# Patient Record
Sex: FEMALE | Race: OTHER | NOT HISPANIC OR LATINO | URBAN - METROPOLITAN AREA
[De-identification: names, ages, dates, MRNs, and addresses within clinical notes are randomized per-mention and may not be internally consistent; named-entity substitution may affect disease eponyms.]

---

## 2017-05-12 ENCOUNTER — INPATIENT (INPATIENT)
Facility: HOSPITAL | Age: 77
LOS: 2 days | Discharge: HOME | DRG: 391 | End: 2017-05-15
Attending: INTERNAL MEDICINE | Admitting: INTERNAL MEDICINE
Payer: COMMERCIAL

## 2017-05-12 ENCOUNTER — TRANSCRIPTION ENCOUNTER (OUTPATIENT)
Age: 77
End: 2017-05-12

## 2017-05-12 VITALS
WEIGHT: 160.06 LBS | HEIGHT: 66 IN | OXYGEN SATURATION: 98 % | SYSTOLIC BLOOD PRESSURE: 146 MMHG | HEART RATE: 68 BPM | TEMPERATURE: 98 F | RESPIRATION RATE: 16 BRPM | DIASTOLIC BLOOD PRESSURE: 75 MMHG

## 2017-05-12 DIAGNOSIS — Z29.9 ENCOUNTER FOR PROPHYLACTIC MEASURES, UNSPECIFIED: ICD-10-CM

## 2017-05-12 DIAGNOSIS — R07.9 CHEST PAIN, UNSPECIFIED: ICD-10-CM

## 2017-05-12 DIAGNOSIS — I10 ESSENTIAL (PRIMARY) HYPERTENSION: ICD-10-CM

## 2017-05-12 DIAGNOSIS — E78.5 HYPERLIPIDEMIA, UNSPECIFIED: ICD-10-CM

## 2017-05-12 DIAGNOSIS — I26.99 OTHER PULMONARY EMBOLISM WITHOUT ACUTE COR PULMONALE: ICD-10-CM

## 2017-05-12 LAB
ANION GAP SERPL CALC-SCNC: 7 MMOL/L — SIGNIFICANT CHANGE UP (ref 5–17)
APTT BLD: 44.3 SEC — HIGH (ref 27.5–37.4)
BASOPHILS # BLD AUTO: 0.1 K/UL — SIGNIFICANT CHANGE UP (ref 0–0.2)
BASOPHILS NFR BLD AUTO: 1.2 % — SIGNIFICANT CHANGE UP (ref 0–2)
BUN SERPL-MCNC: 11 MG/DL — SIGNIFICANT CHANGE UP (ref 7–18)
CALCIUM SERPL-MCNC: 8.9 MG/DL — SIGNIFICANT CHANGE UP (ref 8.4–10.5)
CHLORIDE SERPL-SCNC: 108 MMOL/L — SIGNIFICANT CHANGE UP (ref 96–108)
CK MB BLD-MCNC: <1.7 % — SIGNIFICANT CHANGE UP (ref 0–3.5)
CK MB BLD-MCNC: <1.8 % — SIGNIFICANT CHANGE UP (ref 0–3.5)
CK MB CFR SERPL CALC: <1 NG/ML — SIGNIFICANT CHANGE UP (ref 0–3.6)
CK SERPL-CCNC: 56 U/L — SIGNIFICANT CHANGE UP (ref 21–215)
CK SERPL-CCNC: 58 U/L — SIGNIFICANT CHANGE UP (ref 21–215)
CO2 SERPL-SCNC: 30 MMOL/L — SIGNIFICANT CHANGE UP (ref 22–31)
CREAT SERPL-MCNC: 0.94 MG/DL — SIGNIFICANT CHANGE UP (ref 0.5–1.3)
D DIMER BLD IA.RAPID-MCNC: <150 NG/ML DDU — SIGNIFICANT CHANGE UP
EOSINOPHIL # BLD AUTO: 0.2 K/UL — SIGNIFICANT CHANGE UP (ref 0–0.5)
EOSINOPHIL NFR BLD AUTO: 3 % — SIGNIFICANT CHANGE UP (ref 0–6)
GLUCOSE SERPL-MCNC: 141 MG/DL — HIGH (ref 70–99)
HCT VFR BLD CALC: 38.9 % — SIGNIFICANT CHANGE UP (ref 34.5–45)
HGB BLD-MCNC: 13.3 G/DL — SIGNIFICANT CHANGE UP (ref 11.5–15.5)
INR BLD: 3.19 RATIO — HIGH (ref 0.88–1.16)
LYMPHOCYTES # BLD AUTO: 2.1 K/UL — SIGNIFICANT CHANGE UP (ref 1–3.3)
LYMPHOCYTES # BLD AUTO: 28.7 % — SIGNIFICANT CHANGE UP (ref 13–44)
MCHC RBC-ENTMCNC: 28.6 PG — SIGNIFICANT CHANGE UP (ref 27–34)
MCHC RBC-ENTMCNC: 34.1 GM/DL — SIGNIFICANT CHANGE UP (ref 32–36)
MCV RBC AUTO: 83.9 FL — SIGNIFICANT CHANGE UP (ref 80–100)
MONOCYTES # BLD AUTO: 0.6 K/UL — SIGNIFICANT CHANGE UP (ref 0–0.9)
MONOCYTES NFR BLD AUTO: 8.7 % — SIGNIFICANT CHANGE UP (ref 2–14)
NEUTROPHILS # BLD AUTO: 4.4 K/UL — SIGNIFICANT CHANGE UP (ref 1.8–7.4)
NEUTROPHILS NFR BLD AUTO: 58.5 % — SIGNIFICANT CHANGE UP (ref 43–77)
PLATELET # BLD AUTO: 174 K/UL — SIGNIFICANT CHANGE UP (ref 150–400)
POTASSIUM SERPL-MCNC: 3.1 MMOL/L — LOW (ref 3.5–5.3)
POTASSIUM SERPL-SCNC: 3.1 MMOL/L — LOW (ref 3.5–5.3)
PROTHROM AB SERPL-ACNC: 35.6 SEC — HIGH (ref 9.8–12.7)
RBC # BLD: 4.64 M/UL — SIGNIFICANT CHANGE UP (ref 3.8–5.2)
RBC # FLD: 12.8 % — SIGNIFICANT CHANGE UP (ref 10.3–14.5)
SODIUM SERPL-SCNC: 145 MMOL/L — SIGNIFICANT CHANGE UP (ref 135–145)
TROPONIN I SERPL-MCNC: <0.015 NG/ML — SIGNIFICANT CHANGE UP (ref 0–0.04)
WBC # BLD: 7.4 K/UL — SIGNIFICANT CHANGE UP (ref 3.8–10.5)
WBC # FLD AUTO: 7.4 K/UL — SIGNIFICANT CHANGE UP (ref 3.8–10.5)

## 2017-05-12 PROCEDURE — 71020: CPT | Mod: 26

## 2017-05-12 PROCEDURE — 99285 EMERGENCY DEPT VISIT HI MDM: CPT | Mod: 25

## 2017-05-12 RX ORDER — METOPROLOL TARTRATE 50 MG
12.5 TABLET ORAL
Qty: 0 | Refills: 0 | Status: DISCONTINUED | OUTPATIENT
Start: 2017-05-12 | End: 2017-05-13

## 2017-05-12 RX ORDER — ASPIRIN/CALCIUM CARB/MAGNESIUM 324 MG
81 TABLET ORAL DAILY
Qty: 0 | Refills: 0 | Status: DISCONTINUED | OUTPATIENT
Start: 2017-05-12 | End: 2017-05-15

## 2017-05-12 RX ORDER — AMLODIPINE BESYLATE 2.5 MG/1
10 TABLET ORAL DAILY
Qty: 0 | Refills: 0 | Status: DISCONTINUED | OUTPATIENT
Start: 2017-05-12 | End: 2017-05-15

## 2017-05-12 RX ORDER — ASPIRIN/CALCIUM CARB/MAGNESIUM 324 MG
325 TABLET ORAL ONCE
Qty: 0 | Refills: 0 | Status: COMPLETED | OUTPATIENT
Start: 2017-05-12 | End: 2017-05-12

## 2017-05-12 RX ORDER — ATORVASTATIN CALCIUM 80 MG/1
40 TABLET, FILM COATED ORAL AT BEDTIME
Qty: 0 | Refills: 0 | Status: DISCONTINUED | OUTPATIENT
Start: 2017-05-12 | End: 2017-05-15

## 2017-05-12 RX ORDER — ATORVASTATIN CALCIUM 80 MG/1
80 TABLET, FILM COATED ORAL AT BEDTIME
Qty: 0 | Refills: 0 | Status: COMPLETED | OUTPATIENT
Start: 2017-05-12 | End: 2017-05-12

## 2017-05-12 RX ORDER — WARFARIN SODIUM 2.5 MG/1
2 TABLET ORAL ONCE
Qty: 0 | Refills: 0 | Status: COMPLETED | OUTPATIENT
Start: 2017-05-12 | End: 2017-05-12

## 2017-05-12 RX ORDER — POTASSIUM CHLORIDE 20 MEQ
40 PACKET (EA) ORAL ONCE
Qty: 0 | Refills: 0 | Status: COMPLETED | OUTPATIENT
Start: 2017-05-12 | End: 2017-05-12

## 2017-05-12 RX ORDER — ACETAMINOPHEN WITH CODEINE 300MG-30MG
2 TABLET ORAL EVERY 6 HOURS
Qty: 0 | Refills: 0 | Status: DISCONTINUED | OUTPATIENT
Start: 2017-05-12 | End: 2017-05-15

## 2017-05-12 RX ORDER — PANTOPRAZOLE SODIUM 20 MG/1
40 TABLET, DELAYED RELEASE ORAL
Qty: 0 | Refills: 0 | Status: DISCONTINUED | OUTPATIENT
Start: 2017-05-12 | End: 2017-05-15

## 2017-05-12 RX ADMIN — ATORVASTATIN CALCIUM 40 MILLIGRAM(S): 80 TABLET, FILM COATED ORAL at 22:21

## 2017-05-12 RX ADMIN — Medication 81 MILLIGRAM(S): at 14:12

## 2017-05-12 RX ADMIN — PANTOPRAZOLE SODIUM 40 MILLIGRAM(S): 20 TABLET, DELAYED RELEASE ORAL at 17:44

## 2017-05-12 RX ADMIN — Medication 500 MILLIGRAM(S): at 20:19

## 2017-05-12 RX ADMIN — ATORVASTATIN CALCIUM 80 MILLIGRAM(S): 80 TABLET, FILM COATED ORAL at 08:39

## 2017-05-12 RX ADMIN — Medication 2 TABLET(S): at 19:30

## 2017-05-12 RX ADMIN — Medication 500 MILLIGRAM(S): at 19:30

## 2017-05-12 RX ADMIN — WARFARIN SODIUM 2 MILLIGRAM(S): 2.5 TABLET ORAL at 22:21

## 2017-05-12 NOTE — DISCHARGE NOTE ADULT - CARE PLAN
Principal Discharge DX:	Chest pain  Goal:	Follow up with outpatient cardiologist  Instructions for follow-up, activity and diet:	Your chest pain was not fully investigated and it is still unknown if you have any coronary artery ischemia. Please follow up with a cardiologist of your choice for a stress test as outpatient to fully investigate your cardiac function. Your echocardiogram showed normal left ventricular function. Please see your cardiologist or PCP if you continue to have severe chest pain.  Secondary Diagnosis:	HTN (hypertension)  Goal:	Control blood pressure  Instructions for follow-up, activity and diet:	Please continue to take Norvasc to control your blood pressure. You have electrolyte abnormalities secondary to your HCTZ so please do not take it for now as your blood pressure has been controlled without it. Please follow up with your PCP for regular blood pressure checks and medication adjustments.  Secondary Diagnosis:	HLD (hyperlipidemia)  Goal:	Control cholesterol and lipids  Instructions for follow-up, activity and diet:	You had your lipid panel tested which showed well controlled cholesterol but poorly controlled triglycerides. Please continue to take your lipitor and eat a low fat diet - decrease your intake of fatty fried foods and nuts to decrease your triglyceride level. Please follow up with your PCP for a repeat check in 1 month time and review your medications with him.  Secondary Diagnosis:	GERD (gastroesophageal reflux disease)  Goal:	Take proton pump inhibitors  Instructions for follow-up, activity and diet:	Your chest pain may have been secondary to gastric reflux. Please take Protonix for 6 weeks to see if that relieves your chest pain. If it does not, please follow up with a gastroenterologist of your choice to investigate with further testing.  Secondary Diagnosis:	Pulmonary embolism  Goal:	Continue with anticoagulation  Instructions for follow-up, activity and diet:	Your high INR indicated your coumadin dose is inappropriate. Please skip a dose of coumadin today and take coumadin starting 5/15/17 at night and follow up with your PCP/coumadin check in 3 days time to evaluate your INR and make adjustments to your coumadin dose. Principal Discharge DX:	Chest pain  Goal:	Follow up with outpatient cardiologist  Instructions for follow-up, activity and diet:	Your chest pain was not fully investigated and it is still unknown if you have any coronary artery ischemia. Please follow up with a cardiologist of your choice for a stress test as outpatient to fully investigate your cardiac function. Your echocardiogram showed normal left ventricular function. Please see your cardiologist or PCP if you continue to have severe chest pain.  Secondary Diagnosis:	HTN (hypertension)  Goal:	Control blood pressure  Instructions for follow-up, activity and diet:	Please continue to take Norvasc to control your blood pressure. You have electrolyte abnormalities secondary to your HCTZ so please do not take it for now as your blood pressure has been controlled without it. Please follow up with your PCP for regular blood pressure checks and medication adjustments.  Secondary Diagnosis:	HLD (hyperlipidemia)  Goal:	Control cholesterol and lipids  Instructions for follow-up, activity and diet:	You had your lipid panel tested which showed well controlled cholesterol but poorly controlled triglycerides. Please continue to take your lipitor and eat a low fat diet - decrease your intake of fatty fried foods and nuts to decrease your triglyceride level. Please follow up with your PCP for a repeat check in 1 month time and review your medications with him.  Secondary Diagnosis:	GERD (gastroesophageal reflux disease)  Goal:	Take proton pump inhibitors  Instructions for follow-up, activity and diet:	Your chest pain may have been secondary to gastric reflux. Please take Protonix for 6 weeks to see if that relieves your chest pain. If it does not, please follow up with a gastroenterologist of your choice to investigate with further testing.  Secondary Diagnosis:	Pulmonary embolism  Goal:	Continue with anticoagulation  Instructions for follow-up, activity and diet:	Your high INR indicated your coumadin dose is inappropriate. Please skip a dose of coumadin today and take coumadin starting 5/15/17 at night and follow up with your PCP/coumadin check in 3 days time to evaluate your INR and make adjustments to your coumadin dose. Please follow up with your PCP to investigate changing anticoagulation to a newer oral agent which doesn't need weekly monitoring. Principal Discharge DX:	Chest pain  Goal:	Relief of chest discomfort.  Instructions for follow-up, activity and diet:	Your chest pain was not fully investigated and it is still unknown if you have any coronary artery ischemia. Please follow up with a cardiologist of your choice for a stress test as outpatient to fully investigate your cardiac function. Your echocardiogram showed normal left ventricular function. Please see your cardiologist or PCP if you continue to have severe chest pain.  You was seen by Dr. Gill (Cardiologist) in the hospital  Secondary Diagnosis:	HTN (hypertension)  Goal:	Control blood pressure  Instructions for follow-up, activity and diet:	Please continue to take Norvasc to control your blood pressure. You have electrolyte abnormalities secondary to your HCTZ so please do not take it for now as your blood pressure has been controlled without it. Please follow up with your PCP for regular blood pressure checks and medication adjustments.  Secondary Diagnosis:	HLD (hyperlipidemia)  Goal:	Control cholesterol and lipids  Instructions for follow-up, activity and diet:	You had your lipid panel tested which showed well controlled cholesterol but poorly controlled triglycerides. Please continue to take your lipitor and eat a low fat diet - decrease your intake of fatty fried foods and nuts to decrease your triglyceride level. Please follow up with your PCP for a repeat check in 1 month time and review your medications with him.  Secondary Diagnosis:	GERD (gastroesophageal reflux disease)  Goal:	Resolution of abdominal and chest discomfort at night  Instructions for follow-up, activity and diet:	Your chest pain may have been secondary to gastric reflux. Please take Protonix for 6 weeks to see if that relieves your chest pain. If it does not, please follow up with a gastroenterologist of your choice to investigate with further testing. If persistent chest discomfort at night despite trial of Protonix, follow up with GI for EGD top rule out H. pylori infection. If positive will need 10 to 14 days treatment with 2 antibiotics and PPI (Triple therapy).  Secondary Diagnosis:	Pulmonary embolism  Goal:	Continue with anticoagulation  Instructions for follow-up, activity and diet:	Your high INR indicated your coumadin dose is inappropriate (elevated). Please skip a dose of coumadin today and take coumadin starting 5/15/17 at night at 3mg dose and follow up with your PCP/coumadin check in 3 days time to evaluate your INR and make adjustments to your coumadin dose. Please follow up with your PCP to investigate changing anticoagulation to a newer oral agent which doesn't need weekly monitoring (as you had concern about continuing on coumadin). 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  Instructions for follow-up, activity and diet:	Your chest pain was fully investigated with echocardiogram and nuclear stress test and both tests show that there's no evidence of any heart disease. Likely the source of your chest pain is from your gastric reflux. Please take protonix daily and follow up with a gastroenterologist if the pain doesn't resolve fully.  Secondary Diagnosis:	HTN (hypertension)  Goal:	Control blood pressure  Instructions for follow-up, activity and diet:	Please continue to take Norvasc to control your blood pressure. You have electrolyte abnormalities secondary to your HCTZ so please do not take it for now as your blood pressure has been controlled without it. Please follow up with your PCP for regular blood pressure checks and medication adjustments.  Secondary Diagnosis:	HLD (hyperlipidemia)  Goal:	Control cholesterol and lipids  Instructions for follow-up, activity and diet:	You had your lipid panel tested which showed well controlled cholesterol but poorly controlled triglycerides. Please continue to take your lipitor and eat a low fat diet - decrease your intake of fatty fried foods and nuts to decrease your triglyceride level. Please follow up with your PCP for a repeat check in 1 month time and review your medications with him.  Secondary Diagnosis:	GERD (gastroesophageal reflux disease)  Goal:	Resolution of abdominal and chest discomfort at night  Instructions for follow-up, activity and diet:	Your chest pain may have been secondary to gastric reflux. Please take Protonix for 6 weeks to see if that relieves your chest pain. If it does not, please follow up with a gastroenterologist of your choice to investigate with further testing. If persistent chest discomfort at night despite trial of Protonix, follow up with GI for EGD top rule out H. pylori infection. If positive will need 10 to 14 days treatment with 2 antibiotics and PPI (Triple therapy).  Secondary Diagnosis:	Pulmonary embolism  Goal:	Continue with anticoagulation  Instructions for follow-up, activity and diet:	Your high INR indicated your coumadin dose is inappropriate (elevated). Please take a 3mg dose of coumadin and follow up with your PCP/coumadin check in 3 days time to evaluate your INR and make adjustments to your coumadin dose. Please follow up with your PCP to investigate changing anticoagulation to a newer oral agent which doesn't need weekly monitoring (as you had concern about continuing on coumadin). 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  Instructions for follow-up, activity and diet:	Your chest pain was fully investigated with echocardiogram and nuclear stress test and both tests show that there's no evidence of any heart disease. Likely the source of your chest pain is from your gastric reflux. Please take protonix daily and follow up with a gastroenterologist if the pain doesn't resolve fully.  Secondary Diagnosis:	HTN (hypertension)  Goal:	Control blood pressure  Instructions for follow-up, activity and diet:	Please continue to take Norvasc to control your blood pressure. You have electrolyte abnormalities (low potassium) secondary to your HCTZ so please do not take it for now as your blood pressure has been controlled without it. Please follow up with your PCP for regular blood pressure checks and medication adjustments.  Secondary Diagnosis:	HLD (hyperlipidemia)  Goal:	Control cholesterol and lipids  Instructions for follow-up, activity and diet:	You had your lipid panel tested which showed well controlled cholesterol but poorly controlled triglycerides. Please continue to take your lipitor and eat a low fat diet - decrease your intake of fatty fried foods and nuts to decrease your triglyceride level. Please follow up with your PCP for a repeat check in 1 month time and review your medications with him.  Secondary Diagnosis:	GERD (gastroesophageal reflux disease)  Goal:	Resolution of abdominal and chest discomfort at night  Instructions for follow-up, activity and diet:	Your chest pain may have been secondary to gastric reflux. Please take Protonix for 6 weeks to see if that relieves your chest pain. If it does not, please follow up with a gastroenterologist of your choice to investigate with further testing. If persistent chest discomfort at night despite trial of Protonix, follow up with GI for EGD top rule out H. pylori infection. If positive will need 10 to 14 days treatment with 2 antibiotics and PPI (Triple therapy). You were advised to eat dinner 2 to 3 hrs before sleep. You can also try to take Protonix in the evening before dinner (instead of morning) if pain still persist.  Secondary Diagnosis:	Pulmonary embolism  Goal:	Continue with anticoagulation  Instructions for follow-up, activity and diet:	Your high INR indicated your coumadin dose is inappropriate (elevated). Please take a 3mg dose of coumadin and follow up with your PCP/coumadin check in 3 days time to evaluate your INR and make adjustments to your coumadin dose. Please follow up with your PCP to investigate changing anticoagulation to a newer oral agent which doesn't need weekly monitoring (as you had concern about continuing on coumadin). We have counseled you that you have a hypercoagulable state as you had multiple DVT when you had kids and given you had recurrent events, needs lifelong anticoagulation unless significant side effects like bleeding prevents use of blood thinner  Secondary Diagnosis:	Vitamin D deficiency  Goal:	Normalise levels more than 30  Instructions for follow-up, activity and diet:	take Vitamin D 1000 units daily

## 2017-05-12 NOTE — DISCHARGE NOTE ADULT - HOSPITAL COURSE
76 y/o female with PMHx of HTN, HLD, and pulmonary embolism (on coumadin) diagnosed 10 months ago ( [facility] records not in the system) month ago  presents with chest pain.  patient state the pain inituially started 2 days , and occured at rest when she was lying down in , it was a sharp pain 8/10 , initially was intermittent and had resolved by the time she woke up in the morning .last night patient states the pain came back only time time it was constant , 9/10 , located in her sternal region , and radiates to her entire chest wall , patient states she has reflux disease and normally sleeps with 3 pillows , denies any orthopnea , PND , or leg swelling , claims that the pain has now much better , Denies any orther symtoms ROS is negative except above,    In the ER patient;s VS T97.8 , hr 68 , bp 146/75 , RR 16 Pulse ox of 98 ,Labs were pertinent for normal troponin , ekg showed rate 67 bpm with T Wave flattening in lateral and anterior leads, patient got 1 dose of  and lipitor 80 mg . (12 May 2017 11:33)    In the hospital, patient was tested for acute coronary syndrome as a reason for chest pain. Serial troponins were negative and echocardiogram of the heart showed no evidence of structural damage. Patient was started on Aspirin and Lipitor was continued. Patient was seen by cardiologists Dr. Sher and Jairo who wanted to perform a nuclear stress test which the patient declined. Patient's chest pain could have also been from gastric reflux, patient was started on treatment with protonix and instructed to continue for 6 weeks and follow up with a gastroenterologist if pain doesn't improve. Patient had electrolyte abnormalities presumably secondary to diuretic use. Patient's diuretic was stopped and BP off it was within normal limits. Electrolytes were repleted and monitored and were normal in repeat testing. Patient will be discharged off hydrochlorothiazide and instructed to follow up with PCP for blood pressure check and medication adjustment. For patient's pulmonary embolism, she is on treatment with coumadin but the INR was supratherapeutic. Coumadin was initially held and patient instructed to start coumadin at a later time and follow up as outpatient for an INR check and medication adjustment. Patient is medically stable for discharge with outpatient follow up. Plan discussed and agreed with Dr. Kahn. 74 y/o female with PMHx of HTN, HLD, and pulmonary embolism (on coumadin) diagnosed 10 months ago ( [facility] records not in the system) month ago  presents with chest pain.  patient state the pain inituially started 2 days , and occured at rest when she was lying down in , it was a sharp pain 8/10 , initially was intermittent and had resolved by the time she woke up in the morning .last night patient states the pain came back only time time it was constant , 9/10 , located in her sternal region , and radiates to her entire chest wall , patient states she has reflux disease and normally sleeps with 3 pillows , denies any orthopnea , PND , or leg swelling , claims that the pain has now much better , Denies any orther symtoms ROS is negative except above,    In the ER patient;s VS T97.8 , hr 68 , bp 146/75 , RR 16 Pulse ox of 98 ,Labs were pertinent for normal troponin , ekg showed rate 67 bpm with T Wave flattening in lateral and anterior leads, patient got 1 dose of  and lipitor 80 mg . (12 May 2017 11:33)    In the hospital, patient was tested for acute coronary syndrome as a reason for chest pain. Serial troponins were negative and echocardiogram of the heart showed no evidence of structural damage. Patient was started on Aspirin and Lipitor was continued. Patient was seen by cardiologists Dr. Sher and Jairo who wanted to perform a nuclear stress test which the patient declined. We advised her to follow up with Cardiologist for a Nuclear stress test as outpatient until that time needs to take a baby Aspirin.  Patient's chest pain could have also been from gastric reflux, patient was started on treatment with protonix and instructed to continue for 6 weeks and follow up with a gastroenterologist if pain doesn't improve. Patient had electrolyte abnormalities presumably secondary to diuretic use. Patient's diuretic was stopped and BP off it was within normal limits. Electrolytes were repleted and monitored and were normal in repeat testing. Patient will be discharged off hydrochlorothiazide and instructed to follow up with PCP for blood pressure check and medication adjustment. For patient's pulmonary embolism, she is on treatment with coumadin but the INR was supratherapeutic. Coumadin was initially held and patient instructed to start coumadin at a later time and follow up as outpatient for an INR check and medication adjustment. Patient is medically stable for discharge with outpatient follow up. Plan discussed and agreed with Dr. Kahn. 76 y/o female with PMHx of HTN, HLD, and pulmonary embolism (on coumadin) diagnosed 10 months ago ( [facility] records not in the system) month ago  presents with chest pain.  patient state the pain inituially started 2 days , and occured at rest when she was lying down in , it was a sharp pain 8/10 , initially was intermittent and had resolved by the time she woke up in the morning .last night patient states the pain came back only time time it was constant , 9/10 , located in her sternal region , and radiates to her entire chest wall , patient states she has reflux disease and normally sleeps with 3 pillows , denies any orthopnea , PND , or leg swelling , claims that the pain has now much better , Denies any orther symtoms ROS is negative except above,    In the ER patient;s VS T97.8 , hr 68 , bp 146/75 , RR 16 Pulse ox of 98 ,Labs were pertinent for normal troponin , ekg showed rate 67 bpm with T Wave flattening in lateral and anterior leads, patient got 1 dose of  and lipitor 80 mg . (12 May 2017 11:33)    In the hospital, patient was tested for acute coronary syndrome as a reason for chest pain. Serial troponins were negative and echocardiogram of the heart showed no evidence of structural damage. Patient was started on Aspirin and Lipitor was continued. Patient was seen by cardiologists Dr. Sher and Jairo who performed a nuclear stress test which was negative for coronary artery disease.  Patient's chest pain could have also been from gastric reflux, patient was started on treatment with protonix and instructed to continue for 6 weeks and follow up with a gastroenterologist if pain doesn't improve. Patient had electrolyte abnormalities presumably secondary to diuretic use. Patient's diuretic was stopped and BP off it was within normal limits. Electrolytes were repleted and monitored and were normal in repeat testing. Patient will be discharged off hydrochlorothiazide and instructed to follow up with PCP for blood pressure check and medication adjustment. For patient's pulmonary embolism, she is on treatment with coumadin but the INR was supratherapeutic. Coumadin was initially held and patient instructed to start coumadin at a later time and follow up as outpatient for an INR check and medication adjustment. Patient is medically stable for discharge with outpatient follow up. Plan discussed and agreed with Dr. Kahn.

## 2017-05-12 NOTE — H&P ADULT - NEGATIVE CARDIOVASCULAR SYMPTOMS
no peripheral edema/no orthopnea/no dyspnea on exertion/no paroxysmal nocturnal dyspnea/no palpitations

## 2017-05-12 NOTE — ED PROVIDER NOTE - MEDICAL DECISION MAKING DETAILS
76 y/o female with PMHx of HTN, HLD, PE (on coumadin) presents with chest pain which awoke patient from sleep.  Pain described as sharp and pressure like feeling.  Will check labs, CXR, and reassess.

## 2017-05-12 NOTE — DISCHARGE NOTE ADULT - CARE PROVIDERS DIRECT ADDRESSES
,DirectAddress_Unknown ,DirectAddress_Unknown,[email or web address]

## 2017-05-12 NOTE — DISCHARGE NOTE ADULT - ADDITIONAL INSTRUCTIONS
Follow up with PCP Dr. Marcelo in 5 days;   Follow up INR; Adjust coumadin as per INR  Follow up with Cardiologist outpatient for stress test as advised  If persistent chest discomfort at night despite trial of Protonix, follow up with GI for EGD top rule out H. pylori infection. Follow up with PCP Dr. Marcelo in 5 days;   Follow up INR; Adjust coumadin as per INR  If persistent chest discomfort at night despite trial of Protonix, follow up with GI for EGD top rule out H. pylori infection.

## 2017-05-12 NOTE — DISCHARGE NOTE ADULT - NS AS DC STROKE ED MATERIALS
Prescribed Medications/Call 911 for Stroke/Need for Followup After Discharge/Stroke Warning Signs and Symptoms/Risk Factors for Stroke/Stroke Education Booklet

## 2017-05-12 NOTE — DISCHARGE NOTE ADULT - CARE PROVIDER_API CALL
Kailey Gill), Cardiovascular Disease; Internal Medicine; Interventional Cardiology  [address] [facility] [address] [place] [address]  Phone: [phone]  Fax: [phone] Kailey Gill), Cardiovascular Disease; Internal Medicine; Interventional Cardiology  [address] [address]  Phone: [phone]  Fax: [phone]    Shanta Marcelo), Family Medicine  [address] [place] [address]  Phone: [phone]  Fax: [phone]

## 2017-05-12 NOTE — H&P ADULT - HISTORY OF PRESENT ILLNESS
74 y/o female with PMHx of HTN, HLD, and pulmonary embolism (on coumadin) diagnosed 10 months ago ( [facility] records not in the system) month ago  presents with chest pain.  patient state the pain inituially started 2 days , and occured at rest when she was lying down in , it was a sharp pain 8/10 , initially was intermittent and had resolved by the time she woke up in the morning .last night patient states the pain came back only time time it was constant , 9/10 , located in her sternal region , and radiates to her entire chest wall , patient states she has reflux disease and normally sleeps with 3 pillows , denies any orthopnea , PND , or leg swelling , claims that the pain has now much better , Denies any orther symtoms ROS is negative except above,    In the ER patient;s VS T97.8 , hr 68 , bp 146/75 , RR 16 Pulse ox of 98 ,Labs were pertinent for normal troponin , ekg showed rate 67 bpm with T Wave flattening in lateral and anterior leads, patient got 1 dose of  and lipitor 80 mg .

## 2017-05-12 NOTE — H&P ADULT - ASSESSMENT
74 y/o female with PMHx of HTN, HLD, and pulmonary embolism (on coumadin) diagnosed 10 months ago ( [facility] records not in the system) month ago  presents with chest pain.  is being admitted to r/o ACS:

## 2017-05-12 NOTE — ED ADULT NURSE NOTE - CHPI ED SYMPTOMS NEG
no syncope/no diaphoresis/no chills/no nausea/no vomiting/no cough/no shortness of breath/no dizziness/no back pain

## 2017-05-12 NOTE — DISCHARGE NOTE ADULT - PLAN OF CARE
Follow up with outpatient cardiologist Your chest pain was not fully investigated and it is still unknown if you have any coronary artery ischemia. Please follow up with a cardiologist of your choice for a stress test as outpatient to fully investigate your cardiac function. Your echocardiogram showed normal left ventricular function. Please see your cardiologist or PCP if you continue to have severe chest pain. Control blood pressure Please continue to take Norvasc to control your blood pressure. You have electrolyte abnormalities secondary to your HCTZ so please do not take it for now as your blood pressure has been controlled without it. Please follow up with your PCP for regular blood pressure checks and medication adjustments. Control cholesterol and lipids You had your lipid panel tested which showed well controlled cholesterol but poorly controlled triglycerides. Please continue to take your lipitor and eat a low fat diet - decrease your intake of fatty fried foods and nuts to decrease your triglyceride level. Please follow up with your PCP for a repeat check in 1 month time and review your medications with him. Take proton pump inhibitors Your chest pain may have been secondary to gastric reflux. Please take Protonix for 6 weeks to see if that relieves your chest pain. If it does not, please follow up with a gastroenterologist of your choice to investigate with further testing. Continue with anticoagulation Your high INR indicated your coumadin dose is inappropriate. Please skip a dose of coumadin today and take coumadin starting 5/15/17 at night and follow up with your PCP/coumadin check in 3 days time to evaluate your INR and make adjustments to your coumadin dose. Your high INR indicated your coumadin dose is inappropriate. Please skip a dose of coumadin today and take coumadin starting 5/15/17 at night and follow up with your PCP/coumadin check in 3 days time to evaluate your INR and make adjustments to your coumadin dose. Please follow up with your PCP to investigate changing anticoagulation to a newer oral agent which doesn't need weekly monitoring. Normalise levels more than 30 take Vitamin d 1000 units daily Relief of chest discomfort. Your chest pain was not fully investigated and it is still unknown if you have any coronary artery ischemia. Please follow up with a cardiologist of your choice for a stress test as outpatient to fully investigate your cardiac function. Your echocardiogram showed normal left ventricular function. Please see your cardiologist or PCP if you continue to have severe chest pain.  You was seen by Dr. Gill (Cardiologist) in the hospital Resolution of abdominal and chest discomfort at night Your chest pain may have been secondary to gastric reflux. Please take Protonix for 6 weeks to see if that relieves your chest pain. If it does not, please follow up with a gastroenterologist of your choice to investigate with further testing. If persistent chest discomfort at night despite trial of Protonix, follow up with GI for EGD top rule out H. pylori infection. If positive will need 10 to 14 days treatment with 2 antibiotics and PPI (Triple therapy). Your high INR indicated your coumadin dose is inappropriate (elevated). Please skip a dose of coumadin today and take coumadin starting 5/15/17 at night at 3mg dose and follow up with your PCP/coumadin check in 3 days time to evaluate your INR and make adjustments to your coumadin dose. Please follow up with your PCP to investigate changing anticoagulation to a newer oral agent which doesn't need weekly monitoring (as you had concern about continuing on coumadin). 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 Likely the source of your chest pain is from your gastric reflux. Please take protonix daily and follow up with a gastroenterologist if the pain doesn't resolve fully. Your high INR indicated your coumadin dose is inappropriate (elevated). Please take a 3mg dose of coumadin and follow up with your PCP/coumadin check in 3 days time to evaluate your INR and make adjustments to your coumadin dose. Please follow up with your PCP to investigate changing anticoagulation to a newer oral agent which doesn't need weekly monitoring (as you had concern about continuing on coumadin). We have counseled you that you have a hypercoagulable state as you had multiple DVT when you had kids and given you had recurrent events, needs lifelong anticoagulation unless significant side effects like bleeding prevents use of blood thinner Please continue to take Norvasc to control your blood pressure. You have electrolyte abnormalities (low potassium) secondary to your HCTZ so please do not take it for now as your blood pressure has been controlled without it. Please follow up with your PCP for regular blood pressure checks and medication adjustments. take Vitamin D 1000 units daily Your chest pain may have been secondary to gastric reflux. Please take Protonix for 6 weeks to see if that relieves your chest pain. If it does not, please follow up with a gastroenterologist of your choice to investigate with further testing. If persistent chest discomfort at night despite trial of Protonix, follow up with GI for EGD top rule out H. pylori infection. If positive will need 10 to 14 days treatment with 2 antibiotics and PPI (Triple therapy). You were advised to eat dinner 2 to 3 hrs before sleep. You can also try to take Protonix in the evening before dinner (instead of morning) if pain still persist.

## 2017-05-12 NOTE — H&P ADULT - NS MD HP PULSE CAROTID
Jennie Borrero   MRN: [id number]    Department:  [facility]   Date of Visit:  2/21/2018           Disclosure     Insurance plans vary and the physician(s) referred by the ER may not be covered by your plan.  Please contact CARE PHYSICIAN AT ONCE OR RETURN IMMEDIATELY TO THE EMERGENCY DEPARTMENT. If you have been prescribed any medication(s), please fill your prescription right away and begin taking the medication(s) as directed.   If you believe that any of the medications right normal/left normal

## 2017-05-12 NOTE — DISCHARGE NOTE ADULT - PATIENT PORTAL LINK FT
“You can access the Follow[employer] Patient Portal, offered by [facility], by registering with the following website: [email or web address]”

## 2017-05-12 NOTE — DISCHARGE NOTE ADULT - MEDICATION SUMMARY - MEDICATIONS TO TAKE
I will START or STAY ON the medications listed below when I get home from the hospital:    warfarin 4 mg oral tablet  -- 1 tab(s) by mouth once a day  Take normal coumadin dose starting tomorrow 5/15/17 and then take it for 3 days and follow up with coumadin clinic as soon as possible.  -- Indication: For Pulmonary embolism    atorvastatin 20 mg oral tablet  -- 1 tab(s) by mouth once a day (at bedtime)  -- Indication: For HLD (hyperlipidemia)    amLODIPine 10 mg oral tablet  -- 1 tab(s) by mouth once a day  -- Indication: For HTN (hypertension)    pantoprazole 40 mg oral delayed release tablet  -- 1 tab(s) by mouth once a day (before a meal)  -- Indication: For GERD (gastroesophageal reflux disease) I will START or STAY ON the medications listed below when I get home from the hospital:    acetaminophen-codeine 300 mg-30 mg oral tablet  -- 1 tab(s) by mouth every 8 hours, As Needed, Severe Pain (7 - 10) MDD:3 tabs a day  -- Indication: For Pain    Ecotrin Adult Low Strength 81 mg oral delayed release tablet  -- 1 tab(s) by mouth once a day  -- Swallow whole.  Do not crush.  Take with food or milk.    -- Indication: For Chest pain    warfarin 3 mg oral tablet  -- 1 tab(s) by mouth once a day Take normal coumadin dose starting tomorrow 5/15/17 and then take it for 3 days and follow up with coumadin clinic as soon as possible.  -- Indication: For Pulmonary embolism    atorvastatin 20 mg oral tablet  -- 1 tab(s) by mouth once a day (at bedtime)  -- Indication: For HLD (hyperlipidemia)    amLODIPine 10 mg oral tablet  -- 1 tab(s) by mouth once a day  -- Indication: For HTN (hypertension)    pantoprazole 40 mg oral delayed release tablet  -- 1 tab(s) by mouth once a day (before a meal)  -- Indication: For GERD (gastroesophageal reflux disease) I will START or STAY ON the medications listed below when I get home from the hospital:    acetaminophen-codeine 300 mg-30 mg oral tablet  -- 1 tab(s) by mouth every 8 hours, As Needed, Severe Pain (7 - 10) MDD:3 tabs a day  -- Indication: For Pain    Ecotrin Adult Low Strength 81 mg oral delayed release tablet  -- 1 tab(s) by mouth once a day  -- Swallow whole.  Do not crush.  Take with food or milk.    -- Indication: For Chest pain    warfarin 3 mg oral tablet  -- 1 tab(s) by mouth once a day Take normal coumadin dose starting tomorrow 5/15/17 and then take it for 3 days and follow up with coumadin clinic as soon as possible.  -- Indication: For Pulmonary embolism    atorvastatin 20 mg oral tablet  -- 1 tab(s) by mouth once a day (at bedtime)  -- Indication: For HLD (hyperlipidemia)    amLODIPine 10 mg oral tablet  -- 1 tab(s) by mouth once a day  -- Indication: For HTN (hypertension)    pantoprazole 40 mg oral delayed release tablet  -- 1 tab(s) by mouth once a day (before a meal)  -- Indication: For GERD (gastroesophageal reflux disease)    cholecalciferol 1000 intl units oral tablet  -- 1 tab(s) by mouth once a day  -- Indication: For Vitamin D deficiency I will START or STAY ON the medications listed below when I get home from the hospital:    acetaminophen-codeine 300 mg-30 mg oral tablet  -- 1 tab(s) by mouth every 8 hours, As Needed, Severe Pain (7 - 10) MDD:3 tabs a day  -- Indication: For Pain    Ecotrin Adult Low Strength 81 mg oral delayed release tablet  -- 1 tab(s) by mouth once a day  -- Swallow whole.  Do not crush.  Take with food or milk.    -- Indication: For Chest pain    warfarin 3 mg oral tablet  -- 1 tab(s) by mouth once a day   -- Indication: For Pulmonary embolism    atorvastatin 20 mg oral tablet  -- 1 tab(s) by mouth once a day (at bedtime)  -- Indication: For HLD (hyperlipidemia)    amLODIPine 10 mg oral tablet  -- 1 tab(s) by mouth once a day  -- Indication: For HTN (hypertension)    pantoprazole 40 mg oral delayed release tablet  -- 1 tab(s) by mouth once a day (before a meal)  -- Indication: For GERD (gastroesophageal reflux disease)    cholecalciferol 1000 intl units oral tablet  -- 1 tab(s) by mouth once a day  -- Indication: For Vitamin D deficiency

## 2017-05-12 NOTE — H&P ADULT - PROBLEM SELECTOR PLAN 1
chest pain:  cardiac vs GERD ( Symptoms occur at night when lying flat)  hear score :4 moderate risk  flaquito score of 2  c/w asa statin BB per ACS Protocol  f/u ECHO  Dr Sher cardiology evaluation

## 2017-05-12 NOTE — H&P ADULT - NEGATIVE ENMT SYMPTOMS
no nasal obstruction/no sinus symptoms/no hearing difficulty/no abnormal taste sensation/no vertigo/no nasal discharge/no tinnitus

## 2017-05-12 NOTE — ED PROVIDER NOTE - OBJECTIVE STATEMENT
74 y/o female with PMHx of HTN, HLD, and pulmonary embolism (on coumadin) presents with chest pain.  As per patient, the pain began yesterday during the day, rated 8/10 in maximum intensity, but resolved.  The patient went to bed, however the pain returned, this time waking patient from her sleep.  Pain described as sharp and radiating across chest.  Pt denies nasuea/ vomiting or shortness of breath.  Pt also denies similar symptoms in past.

## 2017-05-12 NOTE — ED ADULT NURSE NOTE - OBJECTIVE STATEMENT
Pt c/o midsternal chest pain that radiates to the rest of her chest that started on 5/10/17. Reports that the sharp pain worsens at night, Denies SOB, nausea, vomiting, pain upon exertion. Hx of HTN and pulmonary embolism

## 2017-05-12 NOTE — DISCHARGE NOTE ADULT - MEDICATION SUMMARY - MEDICATIONS TO STOP TAKING
I will STOP taking the medications listed below when I get home from the hospital:    Depakote 250 mg oral delayed release tablet  -- 3 tab(s) by mouth 2 times a day  -- Do not take this drug if you are pregnant.  It is very important that you take or use this exactly as directed.  Do not skip doses or discontinue unless directed by your doctor.  May cause drowsiness.  Alcohol may intensify this effect.  Use care when operating dangerous machinery.  Swallow whole.  Do not crush.    ergocalciferol 50,000 intl units (1.25 mg) oral capsule  -- 1 cap(s) by mouth once a week.    hydroCHLOROthiazide 12.5 mg oral capsule  -- 1 cap(s) by mouth once a day

## 2017-05-12 NOTE — DISCHARGE NOTE ADULT - SECONDARY DIAGNOSIS.
HTN (hypertension) HLD (hyperlipidemia) GERD (gastroesophageal reflux disease) Pulmonary embolism Vitamin D deficiency

## 2017-05-13 DIAGNOSIS — E87.6 HYPOKALEMIA: ICD-10-CM

## 2017-05-13 DIAGNOSIS — K21.9 GASTRO-ESOPHAGEAL REFLUX DISEASE WITHOUT ESOPHAGITIS: ICD-10-CM

## 2017-05-13 DIAGNOSIS — K59.00 CONSTIPATION, UNSPECIFIED: ICD-10-CM

## 2017-05-13 LAB
ALBUMIN SERPL ELPH-MCNC: 3.5 G/DL — SIGNIFICANT CHANGE UP (ref 3.5–5)
ALP SERPL-CCNC: 128 U/L — HIGH (ref 40–120)
ALT FLD-CCNC: 29 U/L DA — SIGNIFICANT CHANGE UP (ref 10–60)
ANION GAP SERPL CALC-SCNC: 7 MMOL/L — SIGNIFICANT CHANGE UP (ref 5–17)
APTT BLD: 44.2 SEC — HIGH (ref 27.5–37.4)
AST SERPL-CCNC: 21 U/L — SIGNIFICANT CHANGE UP (ref 10–40)
BASOPHILS # BLD AUTO: 0.1 K/UL — SIGNIFICANT CHANGE UP (ref 0–0.2)
BASOPHILS NFR BLD AUTO: 1.7 % — SIGNIFICANT CHANGE UP (ref 0–2)
BILIRUB SERPL-MCNC: 0.8 MG/DL — SIGNIFICANT CHANGE UP (ref 0.2–1.2)
BUN SERPL-MCNC: 11 MG/DL — SIGNIFICANT CHANGE UP (ref 7–18)
CALCIUM SERPL-MCNC: 8.8 MG/DL — SIGNIFICANT CHANGE UP (ref 8.4–10.5)
CHLORIDE SERPL-SCNC: 109 MMOL/L — HIGH (ref 96–108)
CHOLEST SERPL-MCNC: 149 MG/DL — SIGNIFICANT CHANGE UP (ref 10–199)
CO2 SERPL-SCNC: 29 MMOL/L — SIGNIFICANT CHANGE UP (ref 22–31)
CREAT SERPL-MCNC: 0.8 MG/DL — SIGNIFICANT CHANGE UP (ref 0.5–1.3)
EOSINOPHIL # BLD AUTO: 0.1 K/UL — SIGNIFICANT CHANGE UP (ref 0–0.5)
EOSINOPHIL NFR BLD AUTO: 2.7 % — SIGNIFICANT CHANGE UP (ref 0–6)
FOLATE SERPL-MCNC: 15.3 NG/ML — SIGNIFICANT CHANGE UP (ref 4.8–24.2)
GLUCOSE SERPL-MCNC: 109 MG/DL — HIGH (ref 70–99)
HBA1C BLD-MCNC: 5.5 % — SIGNIFICANT CHANGE UP (ref 4–5.6)
HCT VFR BLD CALC: 38 % — SIGNIFICANT CHANGE UP (ref 34.5–45)
HDLC SERPL-MCNC: 37 MG/DL — LOW (ref 40–125)
HGB BLD-MCNC: 12.8 G/DL — SIGNIFICANT CHANGE UP (ref 11.5–15.5)
INR BLD: 3.82 RATIO — HIGH (ref 0.88–1.16)
LIPID PNL WITH DIRECT LDL SERPL: 76 MG/DL — SIGNIFICANT CHANGE UP
LYMPHOCYTES # BLD AUTO: 2.5 K/UL — SIGNIFICANT CHANGE UP (ref 1–3.3)
LYMPHOCYTES # BLD AUTO: 46.7 % — HIGH (ref 13–44)
MAGNESIUM SERPL-MCNC: 2.4 MG/DL — SIGNIFICANT CHANGE UP (ref 1.6–2.6)
MCHC RBC-ENTMCNC: 28.3 PG — SIGNIFICANT CHANGE UP (ref 27–34)
MCHC RBC-ENTMCNC: 33.8 GM/DL — SIGNIFICANT CHANGE UP (ref 32–36)
MCV RBC AUTO: 83.7 FL — SIGNIFICANT CHANGE UP (ref 80–100)
MONOCYTES # BLD AUTO: 0.4 K/UL — SIGNIFICANT CHANGE UP (ref 0–0.9)
MONOCYTES NFR BLD AUTO: 7.4 % — SIGNIFICANT CHANGE UP (ref 2–14)
NEUTROPHILS # BLD AUTO: 2.3 K/UL — SIGNIFICANT CHANGE UP (ref 1.8–7.4)
NEUTROPHILS NFR BLD AUTO: 41.5 % — LOW (ref 43–77)
PHOSPHATE SERPL-MCNC: 2.1 MG/DL — LOW (ref 2.5–4.5)
PLATELET # BLD AUTO: 161 K/UL — SIGNIFICANT CHANGE UP (ref 150–400)
POTASSIUM SERPL-MCNC: 3.4 MMOL/L — LOW (ref 3.5–5.3)
POTASSIUM SERPL-SCNC: 3.4 MMOL/L — LOW (ref 3.5–5.3)
PROT SERPL-MCNC: 6.4 G/DL — SIGNIFICANT CHANGE UP (ref 6–8.3)
PROTHROM AB SERPL-ACNC: 42.8 SEC — HIGH (ref 9.8–12.7)
RBC # BLD: 4.54 M/UL — SIGNIFICANT CHANGE UP (ref 3.8–5.2)
RBC # FLD: 12.7 % — SIGNIFICANT CHANGE UP (ref 10.3–14.5)
SODIUM SERPL-SCNC: 145 MMOL/L — SIGNIFICANT CHANGE UP (ref 135–145)
TOTAL CHOLESTEROL/HDL RATIO MEASUREMENT: 4 RATIO — SIGNIFICANT CHANGE UP (ref 3.3–7.1)
TRIGL SERPL-MCNC: 182 MG/DL — HIGH (ref 10–149)
TSH SERPL-MCNC: 1.46 UU/ML — SIGNIFICANT CHANGE UP (ref 0.34–4.82)
WBC # BLD: 5.4 K/UL — SIGNIFICANT CHANGE UP (ref 3.8–10.5)
WBC # FLD AUTO: 5.4 K/UL — SIGNIFICANT CHANGE UP (ref 3.8–10.5)

## 2017-05-13 PROCEDURE — 99233 SBSQ HOSP IP/OBS HIGH 50: CPT | Mod: GC

## 2017-05-13 RX ORDER — POLYETHYLENE GLYCOL 3350 17 G/17G
17 POWDER, FOR SOLUTION ORAL DAILY
Qty: 0 | Refills: 0 | Status: COMPLETED | OUTPATIENT
Start: 2017-05-13 | End: 2017-05-14

## 2017-05-13 RX ORDER — POTASSIUM CHLORIDE 20 MEQ
40 PACKET (EA) ORAL EVERY 4 HOURS
Qty: 0 | Refills: 0 | Status: DISCONTINUED | OUTPATIENT
Start: 2017-05-13 | End: 2017-05-13

## 2017-05-13 RX ORDER — POTASSIUM CHLORIDE 20 MEQ
40 PACKET (EA) ORAL ONCE
Qty: 0 | Refills: 0 | Status: COMPLETED | OUTPATIENT
Start: 2017-05-13 | End: 2017-05-13

## 2017-05-13 RX ADMIN — POLYETHYLENE GLYCOL 3350 17 GRAM(S): 17 POWDER, FOR SOLUTION ORAL at 11:09

## 2017-05-13 RX ADMIN — Medication 40 MILLIEQUIVALENT(S): at 11:09

## 2017-05-13 RX ADMIN — Medication 12.5 MILLIGRAM(S): at 06:03

## 2017-05-13 RX ADMIN — PANTOPRAZOLE SODIUM 40 MILLIGRAM(S): 20 TABLET, DELAYED RELEASE ORAL at 06:03

## 2017-05-13 RX ADMIN — Medication 2 TABLET(S): at 12:05

## 2017-05-13 RX ADMIN — Medication 2 TABLET(S): at 11:08

## 2017-05-13 RX ADMIN — ATORVASTATIN CALCIUM 40 MILLIGRAM(S): 80 TABLET, FILM COATED ORAL at 21:51

## 2017-05-13 RX ADMIN — Medication 81 MILLIGRAM(S): at 11:14

## 2017-05-13 RX ADMIN — AMLODIPINE BESYLATE 10 MILLIGRAM(S): 2.5 TABLET ORAL at 06:04

## 2017-05-13 NOTE — CONSULT NOTE ADULT - SUBJECTIVE AND OBJECTIVE BOX
Requesting Physician : Dr. Kahn    Reason for Consultation:     HISTORY OF PRESENT ILLNESS: HPI:  76 y/o female with PMHx of HTN, HLD, and pulmonary embolism (on coumadin) diagnosed 10 months ago on ac admitted with chest pain.  The patient reports chest pain while lying down.  No exertional component to her symptoms.  No associated radiations, diaphoresis, n/v, syncope or any other cardiac complaints.  The patient is currently chest pain free.     In the ER patient;s VS T97.8 , hr 68 , bp 146/75 , RR 16 Pulse ox of 98 ,Labs were pertinent for normal troponin , ekg showed rate 67 bpm with T Wave flattening in lateral and anterior leads, patient got 1 dose of  and lipitor 80 mg . (12 May 2017 11:33)      PAST MEDICAL & SURGICAL HISTORY:  PE (pulmonary embolism)  No significant past surgical history          MEDICATIONS:  MEDICATIONS  (STANDING):  amLODIPine   Tablet 10milliGRAM(s) Oral daily  aspirin  chewable 81milliGRAM(s) Oral daily  atorvastatin 40milliGRAM(s) Oral at bedtime  pantoprazole    Tablet 40milliGRAM(s) Oral before breakfast  polyethylene glycol 3350 17Gram(s) Oral daily      Allergies    No Known Allergies    Intolerances        FAMILY HISTORY:    Non-contributary for premature coronary disease or sudden cardiac death    SOCIAL HISTORY:    [v ] Non-smoker  [ ] Smoker  [ ] Alcohol      REVIEW OF SYSTEMS:  [v ]chest pain  [  ]shortness of breath  [  ]palpitations  [  ]syncope  [ ]near syncope [ ]upper extremity weakness   [ ] lower extremity weakness  [  ]diplopia  [  ]altered mental status   [  ]fevers  [ ]chills [ ]nausea  [ ]vomitting  [  ]dysphagia    [ ]abdominal pain  [ ]melena  [ ]BRBPR    [  ]epistaxis  [  ]rash    [ ]lower extremity edema        [v ] All others negative	  [ ] Unable to obtain    PHYSICAL EXAM:  T(C): 37.1, Max: 37.1 (05-13 @ 08:30)  HR: 66 (65 - 75)  BP: 132/66 (132/66 - 154/62)  RR: 16 (16 - 18)  SpO2: 97% (94% - 98%)  Wt(kg): --  I&O's Summary      Appearance: Normal	  HEENT:   Normal oral mucosa, PERRL, EOMI	  Lymphatic: No lymphadenopathy , no edema  Cardiovascular: Normal S1 S2, No JVD, No murmurs , Peripheral pulses palpable 2+ bilaterally  Respiratory: Lungs clear to auscultation, normal effort 	  Gastrointestinal:  Soft, Non-tender, + BS	  Skin: No rashes, No ecchymoses, No cyanosis, warm to touch  Musculoskeletal: Normal range of motion, normal strength  Psychiatry:  Mood & affect appropriate      TELEMETRY: 	    ECG:  	  RADIOLOGY:  OTHER:     DIAGNOSTIC TESTING:  [ ] Echocardiogram:  [ ]  Catheterization:  [ ] Stress Test:    	  	  LABS:	 	    CARDIAC MARKERS:  CARDIAC MARKERS ( 12 May 2017 19:55 )  <0.015 ng/mL / x     / 58 U/L / x     / <1.0 ng/mL  CARDIAC MARKERS ( 12 May 2017 12:48 )  <0.015 ng/mL / x     / 56 U/L / x     / <1.0 ng/mL  CARDIAC MARKERS ( 12 May 2017 06:35 )  <0.015 ng/mL / x     / x     / x     / <1.0 ng/mL                              12.8   5.4   )-----------( 161      ( 13 May 2017 06:37 )             38.0     05-13    145  |  109<H>  |  11  ----------------------------<  109<H>  3.4<L>   |  29  |  0.80    Ca    8.8      13 May 2017 06:37  Phos  2.1     05-13  Mg     2.4     05-13    TPro  6.4  /  Alb  3.5  /  TBili  0.8  /  DBili  x   /  AST  21  /  ALT  29  /  AlkPhos  128<H>  05-13    proBNP:   Lipid Profile:   HgA1c:   TSH: Thyroid Stimulating Hormone, Serum: 1.46 uU/mL (05-13 @ 06:37)      ASSESSMENT/PLAN: 	75yFemale with HTN, HLD, h/o PE on ac admitted with chest pain.   -MI ruled out  -TTE with normal lv function  -Recommend NST to r/o CAD given risk factors if patient aggreable  -AC for h/o PE per primary team

## 2017-05-13 NOTE — PROGRESS NOTE ADULT - ATTENDING COMMENTS
Discussed with patient at length findings, likely etiology and plan of care as documented above, stress teat and Reflux disease

## 2017-05-13 NOTE — PROGRESS NOTE ADULT - SUBJECTIVE AND OBJECTIVE BOX
Patient is a 75y old  Female who presents with a chief complaint of Chest pain (12 May 2017 14:24)    HPI:  76 y/o female with PMHx of HTN, HLD, and pulmonary embolism (on coumadin) diagnosed 10 months ago ( [facility] records not in the system) month ago  presents with chest pain.  patient state the pain inituially started 2 days , and occured at rest when she was lying down in , it was a sharp pain 8/10 , initially was intermittent and had resolved by the time she woke up in the morning .last night patient states the pain came back only time time it was constant , 9/10 , located in her sternal region , and radiates to her entire chest wall , patient states she has reflux disease and normally sleeps with 3 pillows , denies any orthopnea , PND , or leg swelling , claims that the pain has now much better , Denies any orther symtoms ROS is negative except above,    In the ER patient;s VS T97.8 , hr 68 , bp 146/75 , RR 16 Pulse ox of 98 ,Labs were pertinent for normal troponin , ekg showed rate 67 bpm with T Wave flattening in lateral and anterior leads, patient got 1 dose of  and lipitor 80 mg . (12 May 2017 11:33)      OVERNIGHT EVENTS: None; No new chest pain;     MEDICATIONS  (STANDING):  amLODIPine   Tablet 10milliGRAM(s) Oral daily  aspirin  chewable 81milliGRAM(s) Oral daily  atorvastatin 40milliGRAM(s) Oral at bedtime  pantoprazole    Tablet 40milliGRAM(s) Oral before breakfast  polyethylene glycol 3350 17Gram(s) Oral daily  potassium chloride    Tablet ER 40milliEquivalent(s) Oral once    MEDICATIONS  (PRN):  naproxen 500milliGRAM(s) Oral two times a day PRN pain  acetaminophen 300 mG/codeine 30 mG 2Tablet(s) Oral every 6 hours PRN Severe Pain (7 - 10)      Allergies: No Known Allergies        REVIEW OF SYSTEMS:  CONSTITUTIONAL: No fever, weight loss, or fatigue; c/o Morning stiffness in both hands  RESPIRATORY: No cough, wheezing, chills or hemoptysis; No shortness of breath  CARDIOVASCULAR: No chest pain, palpitations, dizziness, or leg swelling  GASTROINTESTINAL: No abdominal or epigastric pain. No nausea, vomiting, or hematemesis; No diarrhea or constipation. No melena or hematochezia. No BM for 2 days  NEUROLOGICAL: No headaches, memory loss, loss of strength, numbness, or tremors  MUSCULOSKELETAL: No joint pain or swelling; No muscle, back, or extremity pain      Vital Signs Last 24 Hrs  T(C): 37.1, Max: 37.1 (05-13 @ 08:30)  T(F): 98.8, Max: 98.8 (05-13 @ 08:30)  HR: 66 (65 - 75)  BP: 132/66 (132/66 - 160/87)  BP(mean): --  RR: 16 (16 - 18)  SpO2: 97% (94% - 98%)    PHYSICAL EXAM:  GENERAL: NAD, well-groomed, well-developed  HEAD:  Atraumatic, Normocephalic  EYES: EOMI, PERRLA, conjunctiva and sclera clear; Drooping of upper left eyelid  NECK: Supple, No JVD,  NERVOUS SYSTEM:  Alert & Oriented X3, No gross focal deficits  CHEST/LUNG: Clear to percussion bilaterally; No rales, rhonchi, wheezing, or rubs  HEART: Regular rate and rhythm; No murmurs, rubs, or gallops  ABDOMEN: Soft,tender in Epigastrium and Left upper quadrant, Nondistended; Bowel sounds present  EXTREMITIES:  No clubbing, cyanosis, or edema  SKIN: No rashes or lesions    LABS:                        12.8   5.4   )-----------( 161      ( 13 May 2017 06:37 )             38.0     05-13    145  |  109<H>  |  11  ----------------------------<  109<H>  3.4<L>   |  29  |  0.80    Ca    8.8      13 May 2017 06:37  Phos  2.1     05-13  Mg     2.4     05-13    TPro  6.4  /  Alb  3.5  /  TBili  0.8  /  DBili  x   /  AST  21  /  ALT  29  /  AlkPhos  128<H>  05-13    PT/INR - ( 13 May 2017 06:37 )   PT: 42.8 sec;   INR: 3.82 ratio     PTT - ( 13 May 2017 06:37 )  PTT:44.2 sec        RADIOLOGY & ADDITIONAL TESTS:    CXR: Atelectasis left lung base. No consolidation or pleural effusion    Heart size within normal limits.    Imaging Personally Reviewed:  [x] YES  [ ] NO (CXR)    2D Echo: CONCLUSIONS:  1. Mitral annular calcification.  2. Normal trileaflet aortic valve.  3. Normal aortic root.  4. Mild left atrial enlargement.  5. Normal left ventricular internal dimensions and wall  thicknesses.  6. Normal Left Ventricular Systolic Function,  (EF = 55%)  7. Grade I diastolic dysfunction  8. Normal right atrium.  9. Normal right ventricular size and function.    Consultant(s) Notes Reviewed:  [x] YES  [ ] NO    Care Discussed with Consultants/Other Providers [x] YES  [ ] NO

## 2017-05-13 NOTE — PROGRESS NOTE ADULT - ASSESSMENT
Patient is a 75y old  Female who presents with a chief complaint of Chest pain (12 May 2017 14:24)   especially at night

## 2017-05-14 DIAGNOSIS — E55.9 VITAMIN D DEFICIENCY, UNSPECIFIED: ICD-10-CM

## 2017-05-14 LAB
24R-OH-CALCIDIOL SERPL-MCNC: 24.6 NG/ML — LOW (ref 30–100)
ANION GAP SERPL CALC-SCNC: 7 MMOL/L — SIGNIFICANT CHANGE UP (ref 5–17)
BUN SERPL-MCNC: 12 MG/DL — SIGNIFICANT CHANGE UP (ref 7–18)
CALCIUM SERPL-MCNC: 8.9 MG/DL — SIGNIFICANT CHANGE UP (ref 8.4–10.5)
CHLORIDE SERPL-SCNC: 108 MMOL/L — SIGNIFICANT CHANGE UP (ref 96–108)
CO2 SERPL-SCNC: 29 MMOL/L — SIGNIFICANT CHANGE UP (ref 22–31)
CREAT SERPL-MCNC: 0.75 MG/DL — SIGNIFICANT CHANGE UP (ref 0.5–1.3)
GLUCOSE SERPL-MCNC: 108 MG/DL — HIGH (ref 70–99)
INR BLD: 3.22 RATIO — HIGH (ref 0.88–1.16)
POTASSIUM SERPL-MCNC: 3.5 MMOL/L — SIGNIFICANT CHANGE UP (ref 3.5–5.3)
POTASSIUM SERPL-SCNC: 3.5 MMOL/L — SIGNIFICANT CHANGE UP (ref 3.5–5.3)
PROTHROM AB SERPL-ACNC: 35.9 SEC — HIGH (ref 9.8–12.7)
SODIUM SERPL-SCNC: 144 MMOL/L — SIGNIFICANT CHANGE UP (ref 135–145)

## 2017-05-14 PROCEDURE — 99233 SBSQ HOSP IP/OBS HIGH 50: CPT | Mod: GC

## 2017-05-14 RX ORDER — PANTOPRAZOLE SODIUM 20 MG/1
1 TABLET, DELAYED RELEASE ORAL
Qty: 50 | Refills: 0 | OUTPATIENT
Start: 2017-05-14 | End: 2017-07-03

## 2017-05-14 RX ORDER — POTASSIUM CHLORIDE 20 MEQ
40 PACKET (EA) ORAL ONCE
Qty: 0 | Refills: 0 | Status: DISCONTINUED | OUTPATIENT
Start: 2017-05-14 | End: 2017-05-14

## 2017-05-14 RX ORDER — CHOLECALCIFEROL (VITAMIN D3) 125 MCG
1 CAPSULE ORAL
Qty: 30 | Refills: 0 | OUTPATIENT
Start: 2017-05-14 | End: 2017-06-13

## 2017-05-14 RX ORDER — CHOLECALCIFEROL (VITAMIN D3) 125 MCG
1000 CAPSULE ORAL DAILY
Qty: 0 | Refills: 0 | Status: DISCONTINUED | OUTPATIENT
Start: 2017-05-14 | End: 2017-05-15

## 2017-05-14 RX ORDER — WARFARIN SODIUM 2.5 MG/1
1 TABLET ORAL
Qty: 0 | Refills: 0 | COMMUNITY

## 2017-05-14 RX ORDER — ACETAMINOPHEN WITH CODEINE 300MG-30MG
1 TABLET ORAL
Qty: 21 | Refills: 0 | OUTPATIENT
Start: 2017-05-14 | End: 2017-05-21

## 2017-05-14 RX ORDER — POTASSIUM CHLORIDE 20 MEQ
40 PACKET (EA) ORAL ONCE
Qty: 0 | Refills: 0 | Status: COMPLETED | OUTPATIENT
Start: 2017-05-14 | End: 2017-05-14

## 2017-05-14 RX ORDER — ASPIRIN/CALCIUM CARB/MAGNESIUM 324 MG
1 TABLET ORAL
Qty: 30 | Refills: 0 | OUTPATIENT
Start: 2017-05-14 | End: 2017-06-13

## 2017-05-14 RX ADMIN — Medication 81 MILLIGRAM(S): at 11:52

## 2017-05-14 RX ADMIN — PANTOPRAZOLE SODIUM 40 MILLIGRAM(S): 20 TABLET, DELAYED RELEASE ORAL at 06:12

## 2017-05-14 RX ADMIN — POLYETHYLENE GLYCOL 3350 17 GRAM(S): 17 POWDER, FOR SOLUTION ORAL at 11:53

## 2017-05-14 RX ADMIN — AMLODIPINE BESYLATE 10 MILLIGRAM(S): 2.5 TABLET ORAL at 06:13

## 2017-05-14 RX ADMIN — ATORVASTATIN CALCIUM 40 MILLIGRAM(S): 80 TABLET, FILM COATED ORAL at 21:24

## 2017-05-14 RX ADMIN — Medication 40 MILLIEQUIVALENT(S): at 12:40

## 2017-05-14 RX ADMIN — Medication 1000 UNIT(S): at 14:10

## 2017-05-14 NOTE — PROGRESS NOTE ADULT - ATTENDING COMMENTS
Discussed with patient at length, findings and plan of care Discussed with patient at length, findings and plan of care;  patient concerned about duration of anticoagulation. She has Hx DVT multiple times when she was pregnant as well as current PE. Given recurrent VTE, she needs life long a/c unless side effects preclude a/c. This was discussed at length with patient. She was also concerned about newer anticoagulants. I have counseled her that on f/u with her PCP, she can certainly consider NOAC's provided her Insurance covers one of them    After she was planned for discharge, she is staying back for stress test tomorrow. Discussed with PGY1 and Cardio Dr. Gill

## 2017-05-14 NOTE — PROGRESS NOTE ADULT - PROBLEM SELECTOR PLAN 3
Corrected; K 3.5 given one more dose today. Discontinued HCTZ  Repeat BMP in AM
Replace one dose 40 meq orally; Hold HCTZ  Repeat BMP in AM

## 2017-05-14 NOTE — PROGRESS NOTE ADULT - SUBJECTIVE AND OBJECTIVE BOX
Subjective:   76 y/o female with PMHx of HTN, HLD, and pulmonary embolism (on coumadin) diagnosed 10 months ago ( [facility] records not in the system) month ago  presents with chest pain.  patient state the pain inituially started 2 days , and occured at rest when she was lying down in , it was a sharp pain 8/10 , initially was intermittent and had resolved by the time she woke up in the morning .last night patient states the pain came back only time time it was constant , 9/10 , located in her sternal region , and radiates to her entire chest wall , patient states she has reflux disease and normally sleeps with 3 pillows , denies any orthopnea , PND , or leg swelling , claims that the pain has now much better , Denies any orther symtoms ROS is negative except above,    MEDICATIONS:  MEDICATIONS  (STANDING):  amLODIPine   Tablet 10milliGRAM(s) Oral daily  aspirin  chewable 81milliGRAM(s) Oral daily  atorvastatin 40milliGRAM(s) Oral at bedtime  pantoprazole    Tablet 40milliGRAM(s) Oral before breakfast  polyethylene glycol 3350 17Gram(s) Oral daily      PHYSICAL EXAM:  T(C): 36.8, Max: 37.1 (05-13 @ 08:30)  HR: 62 (62 - 80)  BP: 139/64 (126/62 - 152/71)  RR: 18 (16 - 20)  SpO2: 97% (96% - 98%)  Wt(kg): --  I&O's Summary        Appearance: Normal	  HEENT:   Normal oral mucosa, PERRL, EOMI	  Lymphatic: No lymphadenopathy , no edema  Cardiovascular: Normal S1 S2, No JVD, No murmurs , Peripheral pulses palpable 2+ bilaterally  Respiratory: Lungs clear to auscultation, normal effort 	  Gastrointestinal:  Soft, Non-tender, + BS	  Skin: No rashes, No ecchymoses, No cyanosis, warm to touch  Musculoskeletal: Normal range of motion, normal strength  Psychiatry:  Mood & affect appropriate    TELEMETRY: 	    ECG:  	  RADIOLOGY:   DIAGNOSTIC TESTING:  [ ] Echocardiogram:  2D Echo: CONCLUSIONS:  1. Mitral annular calcification.  2. Normal trileaflet aortic valve.  3. Normal aortic root.  4. Mild left atrial enlargement.  5. Normal left ventricular internal dimensions and wall  thicknesses.  6. Normal Left Ventricular Systolic Function,  (EF = 55%)  7. Grade I diastolic dysfunction  8. Normal right atrium.  9. Normal right ventricular size and function.  [ ]  Catheterization:  [ ] Stress Test:    OTHER: 	    LABS:	 	    CARDIAC MARKERS:  CARDIAC MARKERS ( 12 May 2017 19:55 )  <0.015 ng/mL / x     / 58 U/L / x     / <1.0 ng/mL  CARDIAC MARKERS ( 12 May 2017 12:48 )  <0.015 ng/mL / x     / 56 U/L / x     / <1.0 ng/mL  CARDIAC MARKERS ( 12 May 2017 06:35 )  <0.015 ng/mL / x     / x     / x     / <1.0 ng/mL                                12.8   5.4   )-----------( 161      ( 13 May 2017 06:37 )             38.0     05-14    144  |  108  |  12  ----------------------------<  108<H>  3.5   |  29  |  0.75    Ca    8.9      14 May 2017 07:16  Phos  2.1     05-13  Mg     2.4     05-13    TPro  6.4  /  Alb  3.5  /  TBili  0.8  /  DBili  x   /  AST  21  /  ALT  29  /  AlkPhos  128<H>  05-13    proBNP:   Lipid Profile:   HgA1c: Hemoglobin A1C, Whole Blood: 5.5 % (05-13 @ 10:08)    TSH:     ASSESSMENT/PLAN: 	75y Female 75yFemale with HTN, HLD, h/o PE on ac admitted with chest pain.   -MI ruled out  -TTE with normal lv function  - NST to r/o CAD tomorrow   -AC for h/o PE per primary team   GI/ DVT prophylaxis  cont tele - stable , no arrythmia  further plan post NST  d/w dr Gill

## 2017-05-14 NOTE — PROGRESS NOTE ADULT - ATTENDING COMMENTS
Pt. seen and examined.  Agree with above.   -Recommend NST to r/o CAD  -However pt. refusing NST.  The patient understands all risks of refusal including but not limited to death/MI and continues to refuse.

## 2017-05-14 NOTE — PROGRESS NOTE ADULT - SUBJECTIVE AND OBJECTIVE BOX
Patient is a 75y old  Female who presents with a chief complaint of Chest pain (12 May 2017 14:24)      INTERVAL HPI/OVERNIGHT EVENTS: No new complaints; wanted to leave this morning    MEDICATIONS  (STANDING):  amLODIPine   Tablet 10milliGRAM(s) Oral daily  aspirin  chewable 81milliGRAM(s) Oral daily  atorvastatin 40milliGRAM(s) Oral at bedtime  pantoprazole    Tablet 40milliGRAM(s) Oral before breakfast  cholecalciferol 1000Unit(s) Oral daily    MEDICATIONS  (PRN):  naproxen 500milliGRAM(s) Oral two times a day PRN pain  acetaminophen 300 mG/codeine 30 mG 2Tablet(s) Oral every 6 hours PRN Severe Pain (7 - 10)        REVIEW OF SYSTEMS:  CONSTITUTIONAL: No fever, weight loss, or fatigue  RESPIRATORY: No cough, wheezing, chills or hemoptysis; No shortness of breath  CARDIOVASCULAR: No chest pain, palpitations, dizziness, or leg swelling  GASTROINTESTINAL: No abdominal or epigastric pain. No nausea, vomiting, or hematemesis; No diarrhea; No BM for 3 days No melena or hematochezia.  NEUROLOGICAL: No headaches, memory loss, loss of strength, numbness, or tremors  MUSCULOSKELETAL: No joint pain or swelling; No muscle, back, or extremity pain  SKIN: No rashes or lesions      Vital Signs Last 24 Hrs  T(C): 36.7, Max: 36.9 (05-13 @ 15:29)  T(F): 98, Max: 98.5 (05-13 @ 15:29)  HR: 66 (62 - 80)  BP: 143/66 (126/62 - 152/71)  BP(mean): --  RR: 18 (18 - 20)  SpO2: 96% (96% - 97%)    PHYSICAL EXAM:  GENERAL: NAD, well-groomed, well-developed  HEAD:  Atraumatic, Normocephalic  EYES: EOMI, PERRLA, conjunctiva and sclera clear  NECK: Supple, No JVD  NERVOUS SYSTEM:  Alert & Oriented X3, No gross focal deficits  CHEST/LUNG: Clear to percussion bilaterally; No rales, rhonchi, wheezing, or rubs  HEART: Regular rate and rhythm; S1S2 present  ABDOMEN: Soft, Mild epigastric tenderness, Nondistended; Bowel sounds present  EXTREMITIES:  No clubbing, cyanosis, or edema  SKIN: No rashes or lesions    LABS:                        12.8   5.4   )-----------( 161      ( 13 May 2017 06:37 )             38.0     05-14    144  |  108  |  12  ----------------------------<  108<H>  3.5   |  29  |  0.75    Ca    8.9      14 May 2017 07:16  Phos  2.1     05-13  Mg     2.4     05-13    TPro  6.4  /  Alb  3.5  /  TBili  0.8  /  DBili  x   /  AST  21  /  ALT  29  /  AlkPhos  128<H>  05-13    PT/INR - ( 14 May 2017 07:16 )   PT: 35.9 sec;   INR: 3.22 ratio         PTT - ( 13 May 2017 06:37 )  PTT:44.2 sec        Consultant(s) Notes Reviewed:  [x] YES  [ ] NO    Care Discussed with Consultants/Other Providers [x] YES  [ ] NO    Plan of Care discussed with Housestaff [x]YES [ ] NO

## 2017-05-14 NOTE — PROGRESS NOTE ADULT - PROBLEM SELECTOR PLAN 4
INR supra therapeutic; will hold coumadin tonight; repeat INR in AM
INR supra therapeutic; will hold coumadin tonight; repeat INR in AM

## 2017-05-14 NOTE — PROGRESS NOTE ADULT - PROBLEM SELECTOR PLAN 2
Chest pain especially at night concern for Reflux disease.  Will give trial of PPI if Cardiac w/u negative for 6 weeks;  Discussed with patient If stress test negative and if pain still persist after 6 weeks with PPI trial, needs to f/u with GI outpatient for an EGD to OT eval complete.uate for H. Pylori which if positive need to be treated with triple therapy.
Chest pain especially at night concern for Reflux disease.  Will give trial of PPI if Cardiac w/u negative for 6 weeks;  Discussed with patient if pain still persist after 6 weeks, needs to f/u with GI outpatient for an EGD to evaluate for H. Pylori which if positive need to be treated with triple therapy.

## 2017-05-14 NOTE — PROGRESS NOTE ADULT - PROBLEM SELECTOR PLAN 1
Serial troponin negative for ACS; Continue Ecotrin 81mg; low dose beta blocker and statin; Echo results reviewed;   Discussed with Cardiology Dr. Gill; Plan for Nuclear stress test tomorrow. Counseled patient at length.  Patient wanted to go home due to social situation with  Dementia but agreed to stay and proceed with stress test in morning.
Serial troponin negative for ACS; Continue Ecotrin 81mg; low dose beta blocker and statin; Echo results reviewed;   Discussed with Cardiology Dr. Gill; Plan for Nuclear stress test Monday if patient agrees;

## 2017-05-15 VITALS
TEMPERATURE: 98 F | DIASTOLIC BLOOD PRESSURE: 79 MMHG | SYSTOLIC BLOOD PRESSURE: 125 MMHG | HEART RATE: 69 BPM | RESPIRATION RATE: 18 BRPM | OXYGEN SATURATION: 98 %

## 2017-05-15 LAB
INR BLD: 2.34 RATIO — HIGH (ref 0.88–1.16)
PROTHROM AB SERPL-ACNC: 26 SEC — HIGH (ref 9.8–12.7)

## 2017-05-15 PROCEDURE — G0378: CPT

## 2017-05-15 PROCEDURE — 99285 EMERGENCY DEPT VISIT HI MDM: CPT | Mod: 25

## 2017-05-15 PROCEDURE — 83036 HEMOGLOBIN GLYCOSYLATED A1C: CPT

## 2017-05-15 PROCEDURE — A9502: CPT

## 2017-05-15 PROCEDURE — 80053 COMPREHEN METABOLIC PANEL: CPT

## 2017-05-15 PROCEDURE — 83735 ASSAY OF MAGNESIUM: CPT

## 2017-05-15 PROCEDURE — 84443 ASSAY THYROID STIM HORMONE: CPT

## 2017-05-15 PROCEDURE — 85730 THROMBOPLASTIN TIME PARTIAL: CPT

## 2017-05-15 PROCEDURE — 99239 HOSP IP/OBS DSCHRG MGMT >30: CPT

## 2017-05-15 PROCEDURE — 85027 COMPLETE CBC AUTOMATED: CPT

## 2017-05-15 PROCEDURE — 84100 ASSAY OF PHOSPHORUS: CPT

## 2017-05-15 PROCEDURE — 80048 BASIC METABOLIC PNL TOTAL CA: CPT

## 2017-05-15 PROCEDURE — 93005 ELECTROCARDIOGRAM TRACING: CPT

## 2017-05-15 PROCEDURE — 85379 FIBRIN DEGRADATION QUANT: CPT

## 2017-05-15 PROCEDURE — 71046 X-RAY EXAM CHEST 2 VIEWS: CPT

## 2017-05-15 PROCEDURE — 82306 VITAMIN D 25 HYDROXY: CPT

## 2017-05-15 PROCEDURE — 82550 ASSAY OF CK (CPK): CPT

## 2017-05-15 PROCEDURE — 82746 ASSAY OF FOLIC ACID SERUM: CPT

## 2017-05-15 PROCEDURE — 93306 TTE W/DOPPLER COMPLETE: CPT

## 2017-05-15 PROCEDURE — 80061 LIPID PANEL: CPT

## 2017-05-15 PROCEDURE — 78452 HT MUSCLE IMAGE SPECT MULT: CPT

## 2017-05-15 PROCEDURE — 85610 PROTHROMBIN TIME: CPT

## 2017-05-15 PROCEDURE — 84484 ASSAY OF TROPONIN QUANT: CPT

## 2017-05-15 PROCEDURE — 82553 CREATINE MB FRACTION: CPT

## 2017-05-15 PROCEDURE — 93017 CV STRESS TEST TRACING ONLY: CPT

## 2017-05-15 RX ORDER — WARFARIN SODIUM 2.5 MG/1
1 TABLET ORAL
Qty: 0 | Refills: 0 | COMMUNITY

## 2017-05-15 RX ORDER — WARFARIN SODIUM 2.5 MG/1
3 TABLET ORAL ONCE
Qty: 0 | Refills: 0 | Status: DISCONTINUED | OUTPATIENT
Start: 2017-05-15 | End: 2017-05-15

## 2017-05-15 RX ADMIN — PANTOPRAZOLE SODIUM 40 MILLIGRAM(S): 20 TABLET, DELAYED RELEASE ORAL at 06:01

## 2017-05-15 RX ADMIN — Medication 1000 UNIT(S): at 12:28

## 2017-05-15 RX ADMIN — Medication 81 MILLIGRAM(S): at 12:28

## 2017-05-15 RX ADMIN — Medication 2 TABLET(S): at 01:15

## 2017-05-15 RX ADMIN — Medication 2 TABLET(S): at 12:39

## 2017-05-15 RX ADMIN — AMLODIPINE BESYLATE 10 MILLIGRAM(S): 2.5 TABLET ORAL at 06:01

## 2017-05-15 NOTE — PROGRESS NOTE ADULT - SUBJECTIVE AND OBJECTIVE BOX
Patient denies CP, SOB, ROS otherwise (-)      MEDICATIONS  (STANDING):  amLODIPine   Tablet 10milliGRAM(s) Oral daily  aspirin  chewable 81milliGRAM(s) Oral daily  atorvastatin 40milliGRAM(s) Oral at bedtime  pantoprazole    Tablet 40milliGRAM(s) Oral before breakfast  cholecalciferol 1000Unit(s) Oral daily  warfarin 3milliGRAM(s) Oral once      PHYSICAL EXAM:  T(C): 36.7, Max: 37.1 (05-14 @ 19:26)  HR: 68 (66 - 76)  BP: 151/66 (123/67 - 151/66)  RR: 16 (16 - 20)  SpO2: 100% (96% - 100%)        Appearance: Normal	  HEENT:   Normal oral mucosa, PERRL, EOMI	  Lymphatic: No lymphadenopathy , no edema  Cardiovascular: Normal S1 S2, No JVD, No murmurs , Peripheral pulses palpable 2+ bilaterally  Respiratory: Lungs clear to auscultation, normal effort 	  Gastrointestinal:  Soft, Non-tender, + BS	  Skin: No rashes, No ecchymoses, No cyanosis, warm to touch  Musculoskeletal: Normal range of motion, normal strength  Psychiatry:  Mood & affect appropriate    TELEMETRY: 	  Sinus 70's    LABS:	 	    CARDIAC MARKERS:  CARDIAC MARKERS ( 12 May 2017 19:55 )  <0.015 ng/mL / x     / 58 U/L / x     / <1.0 ng/mL  CARDIAC MARKERS ( 12 May 2017 12:48 )  <0.015 ng/mL / x     / 56 U/L / x     / <1.0 ng/mL        05-14    144  |  108  |  12  ----------------------------<  108<H>  3.5   |  29  |  0.75    Ca    8.9      14 May 2017 07:16        ASSESSMENT/PLAN: 	75y Female  HTN, HLD, h/o PE on ac admitted with chest pain.  r/o For MI.  Normal LV function on echo. Patient denies CP, SOB, ROS otherwise (-)      MEDICATIONS  (STANDING):  amLODIPine   Tablet 10milliGRAM(s) Oral daily  aspirin  chewable 81milliGRAM(s) Oral daily  atorvastatin 40milliGRAM(s) Oral at bedtime  pantoprazole    Tablet 40milliGRAM(s) Oral before breakfast  cholecalciferol 1000Unit(s) Oral daily  warfarin 3milliGRAM(s) Oral once      PHYSICAL EXAM:  T(C): 36.7, Max: 37.1 (05-14 @ 19:26)  HR: 68 (66 - 76)  BP: 151/66 (123/67 - 151/66)  RR: 16 (16 - 20)  SpO2: 100% (96% - 100%)        Appearance: Normal	  HEENT:   Normal oral mucosa, PERRL, EOMI	  Lymphatic: No lymphadenopathy , no edema  Cardiovascular: Normal S1 S2, No JVD, No murmurs , Peripheral pulses palpable 2+ bilaterally  Respiratory: Lungs clear to auscultation, normal effort 	  Gastrointestinal:  Soft, Non-tender, + BS	  Skin: No rashes, No ecchymoses, No cyanosis, warm to touch  Musculoskeletal: Normal range of motion, normal strength  Psychiatry:  Mood & affect appropriate    TELEMETRY: 	  Sinus 70's    LABS:	 	    CARDIAC MARKERS:  CARDIAC MARKERS ( 12 May 2017 19:55 )  <0.015 ng/mL / x     / 58 U/L / x     / <1.0 ng/mL  CARDIAC MARKERS ( 12 May 2017 12:48 )  <0.015 ng/mL / x     / 56 U/L / x     / <1.0 ng/mL        05-14    144  |  108  |  12  ----------------------------<  108<H>  3.5   |  29  |  0.75    Ca    8.9      14 May 2017 07:16        ASSESSMENT/PLAN: 	75y Female  HTN, HLD, h/o PE on ac admitted with chest pain.  r/o For MI.  Normal LV function on echo.  -nuclear stress  -Cont anticoagulation per primary team  -If stress test with no significant ischemia, D/C plan per primary team

## 2017-05-17 DIAGNOSIS — E78.5 HYPERLIPIDEMIA, UNSPECIFIED: ICD-10-CM

## 2017-05-17 DIAGNOSIS — E87.6 HYPOKALEMIA: ICD-10-CM

## 2017-05-17 DIAGNOSIS — R07.9 CHEST PAIN, UNSPECIFIED: ICD-10-CM

## 2017-05-17 DIAGNOSIS — I26.99 OTHER PULMONARY EMBOLISM WITHOUT ACUTE COR PULMONALE: ICD-10-CM

## 2017-05-17 DIAGNOSIS — K21.9 GASTRO-ESOPHAGEAL REFLUX DISEASE WITHOUT ESOPHAGITIS: ICD-10-CM

## 2017-05-17 DIAGNOSIS — E55.9 VITAMIN D DEFICIENCY, UNSPECIFIED: ICD-10-CM

## 2017-05-17 DIAGNOSIS — I10 ESSENTIAL (PRIMARY) HYPERTENSION: ICD-10-CM

## 2017-08-14 ENCOUNTER — EMERGENCY (EMERGENCY)
Facility: HOSPITAL | Age: 77
LOS: 1 days | Discharge: HOME | End: 2017-08-14
Admitting: EMERGENCY MEDICINE
Payer: COMMERCIAL

## 2017-08-14 DIAGNOSIS — Z79.899 OTHER LONG TERM (CURRENT) DRUG THERAPY: ICD-10-CM

## 2017-08-14 DIAGNOSIS — I10 ESSENTIAL (PRIMARY) HYPERTENSION: ICD-10-CM

## 2017-08-14 DIAGNOSIS — R51 HEADACHE: ICD-10-CM

## 2017-08-14 DIAGNOSIS — Z79.01 LONG TERM (CURRENT) USE OF ANTICOAGULANTS: ICD-10-CM

## 2017-08-14 PROCEDURE — 85027 COMPLETE CBC AUTOMATED: CPT

## 2017-08-14 PROCEDURE — 99285 EMERGENCY DEPT VISIT HI MDM: CPT

## 2017-08-14 PROCEDURE — 96374 THER/PROPH/DIAG INJ IV PUSH: CPT

## 2017-08-14 PROCEDURE — 99284 EMERGENCY DEPT VISIT MOD MDM: CPT | Mod: 25

## 2017-08-14 PROCEDURE — 70450 CT HEAD/BRAIN W/O DYE: CPT

## 2017-08-14 PROCEDURE — 85610 PROTHROMBIN TIME: CPT

## 2017-08-14 PROCEDURE — 70450 CT HEAD/BRAIN W/O DYE: CPT | Mod: 26

## 2017-08-14 PROCEDURE — 96375 TX/PRO/DX INJ NEW DRUG ADDON: CPT

## 2017-08-14 PROCEDURE — 85730 THROMBOPLASTIN TIME PARTIAL: CPT

## 2017-08-14 PROCEDURE — 80048 BASIC METABOLIC PNL TOTAL CA: CPT

## 2017-12-10 ENCOUNTER — APPOINTMENT (OUTPATIENT)
Dept: MRI IMAGING | Facility: IMAGING CENTER | Age: 77
End: 2017-12-10

## 2018-07-30 ENCOUNTER — EMERGENCY (EMERGENCY)
Facility: HOSPITAL | Age: 78
LOS: 1 days | Discharge: HOME | End: 2018-07-30
Attending: EMERGENCY MEDICINE | Admitting: EMERGENCY MEDICINE
Payer: COMMERCIAL

## 2018-07-30 VITALS
HEIGHT: 66 IN | HEART RATE: 58 BPM | SYSTOLIC BLOOD PRESSURE: 166 MMHG | WEIGHT: 154.98 LBS | TEMPERATURE: 98 F | DIASTOLIC BLOOD PRESSURE: 85 MMHG | OXYGEN SATURATION: 97 % | RESPIRATION RATE: 18 BRPM

## 2018-07-30 LAB
ALBUMIN SERPL ELPH-MCNC: 3.9 G/DL — SIGNIFICANT CHANGE UP (ref 3.3–5)
ALP SERPL-CCNC: 100 U/L — SIGNIFICANT CHANGE UP (ref 40–120)
ALT FLD-CCNC: 27 U/L DA — SIGNIFICANT CHANGE UP (ref 10–45)
ANION GAP SERPL CALC-SCNC: 5 MMOL/L — SIGNIFICANT CHANGE UP (ref 5–17)
APPEARANCE UR: CLEAR — SIGNIFICANT CHANGE UP
APTT BLD: 25.6 SEC — LOW (ref 27.5–37.4)
AST SERPL-CCNC: 24 U/L — SIGNIFICANT CHANGE UP (ref 10–40)
BASOPHILS # BLD AUTO: 0.1 K/UL — SIGNIFICANT CHANGE UP (ref 0–0.2)
BASOPHILS NFR BLD AUTO: 0.9 % — SIGNIFICANT CHANGE UP (ref 0–2)
BILIRUB SERPL-MCNC: 1 MG/DL — SIGNIFICANT CHANGE UP (ref 0.2–1.2)
BILIRUB UR-MCNC: NEGATIVE — SIGNIFICANT CHANGE UP
BUN SERPL-MCNC: 12 MG/DL — SIGNIFICANT CHANGE UP (ref 7–23)
CALCIUM SERPL-MCNC: 9.7 MG/DL — SIGNIFICANT CHANGE UP (ref 8.4–10.5)
CHLORIDE SERPL-SCNC: 105 MMOL/L — SIGNIFICANT CHANGE UP (ref 96–108)
CO2 SERPL-SCNC: 31 MMOL/L — SIGNIFICANT CHANGE UP (ref 22–31)
COLOR SPEC: YELLOW — SIGNIFICANT CHANGE UP
CREAT SERPL-MCNC: 0.74 MG/DL — SIGNIFICANT CHANGE UP (ref 0.5–1.3)
DIFF PNL FLD: NEGATIVE — SIGNIFICANT CHANGE UP
EOSINOPHIL # BLD AUTO: 0.1 K/UL — SIGNIFICANT CHANGE UP (ref 0–0.5)
EOSINOPHIL NFR BLD AUTO: 2.2 % — SIGNIFICANT CHANGE UP (ref 0–6)
GLUCOSE SERPL-MCNC: 118 MG/DL — HIGH (ref 70–99)
GLUCOSE UR QL: NEGATIVE — SIGNIFICANT CHANGE UP
HCT VFR BLD CALC: 39.5 % — SIGNIFICANT CHANGE UP (ref 34.5–45)
HGB BLD-MCNC: 13.7 G/DL — SIGNIFICANT CHANGE UP (ref 11.5–15.5)
INR BLD: 1.01 RATIO — SIGNIFICANT CHANGE UP (ref 0.88–1.16)
KETONES UR-MCNC: NEGATIVE — SIGNIFICANT CHANGE UP
LEUKOCYTE ESTERASE UR-ACNC: ABNORMAL
LYMPHOCYTES # BLD AUTO: 1.8 K/UL — SIGNIFICANT CHANGE UP (ref 1–3.3)
LYMPHOCYTES # BLD AUTO: 32.9 % — SIGNIFICANT CHANGE UP (ref 13–44)
MCHC RBC-ENTMCNC: 29.3 PG — SIGNIFICANT CHANGE UP (ref 27–34)
MCHC RBC-ENTMCNC: 34.7 GM/DL — SIGNIFICANT CHANGE UP (ref 32–36)
MCV RBC AUTO: 84.4 FL — SIGNIFICANT CHANGE UP (ref 80–100)
MONOCYTES # BLD AUTO: 0.4 K/UL — SIGNIFICANT CHANGE UP (ref 0–0.9)
MONOCYTES NFR BLD AUTO: 6.6 % — SIGNIFICANT CHANGE UP (ref 1–9)
NEUTROPHILS # BLD AUTO: 3.2 K/UL — SIGNIFICANT CHANGE UP (ref 1.8–7.4)
NEUTROPHILS NFR BLD AUTO: 57.5 % — SIGNIFICANT CHANGE UP (ref 43–77)
NITRITE UR-MCNC: NEGATIVE — SIGNIFICANT CHANGE UP
PH UR: 6.5 — SIGNIFICANT CHANGE UP (ref 5–8)
PLATELET # BLD AUTO: 195 K/UL — SIGNIFICANT CHANGE UP (ref 150–400)
POTASSIUM SERPL-MCNC: 3.1 MMOL/L — LOW (ref 3.5–5.3)
POTASSIUM SERPL-SCNC: 3.1 MMOL/L — LOW (ref 3.5–5.3)
PROT SERPL-MCNC: 7.2 G/DL — SIGNIFICANT CHANGE UP (ref 6–8.3)
PROT UR-MCNC: NEGATIVE — SIGNIFICANT CHANGE UP
PROTHROM AB SERPL-ACNC: 11.2 SEC — SIGNIFICANT CHANGE UP (ref 9.8–12.7)
RBC # BLD: 4.68 M/UL — SIGNIFICANT CHANGE UP (ref 3.8–5.2)
RBC # FLD: 12.9 % — SIGNIFICANT CHANGE UP (ref 10.3–14.5)
SODIUM SERPL-SCNC: 141 MMOL/L — SIGNIFICANT CHANGE UP (ref 135–145)
SP GR SPEC: 1 — LOW (ref 1.01–1.02)
UROBILINOGEN FLD QL: NEGATIVE — SIGNIFICANT CHANGE UP
WBC # BLD: 5.5 K/UL — SIGNIFICANT CHANGE UP (ref 3.8–10.5)
WBC # FLD AUTO: 5.5 K/UL — SIGNIFICANT CHANGE UP (ref 3.8–10.5)

## 2018-07-30 PROCEDURE — 99285 EMERGENCY DEPT VISIT HI MDM: CPT

## 2018-07-30 PROCEDURE — 85610 PROTHROMBIN TIME: CPT

## 2018-07-30 PROCEDURE — 74174 CTA ABD&PLVS W/CONTRAST: CPT | Mod: 26

## 2018-07-30 PROCEDURE — 81001 URINALYSIS AUTO W/SCOPE: CPT

## 2018-07-30 PROCEDURE — 85027 COMPLETE CBC AUTOMATED: CPT

## 2018-07-30 PROCEDURE — 74174 CTA ABD&PLVS W/CONTRAST: CPT

## 2018-07-30 PROCEDURE — 71275 CT ANGIOGRAPHY CHEST: CPT | Mod: 26

## 2018-07-30 PROCEDURE — 71275 CT ANGIOGRAPHY CHEST: CPT

## 2018-07-30 PROCEDURE — 87086 URINE CULTURE/COLONY COUNT: CPT

## 2018-07-30 PROCEDURE — 99284 EMERGENCY DEPT VISIT MOD MDM: CPT | Mod: 25

## 2018-07-30 PROCEDURE — 96374 THER/PROPH/DIAG INJ IV PUSH: CPT | Mod: XU

## 2018-07-30 PROCEDURE — 87186 SC STD MICRODIL/AGAR DIL: CPT

## 2018-07-30 PROCEDURE — 85730 THROMBOPLASTIN TIME PARTIAL: CPT

## 2018-07-30 PROCEDURE — 80053 COMPREHEN METABOLIC PANEL: CPT

## 2018-07-30 RX ORDER — MORPHINE SULFATE 50 MG/1
4 CAPSULE, EXTENDED RELEASE ORAL ONCE
Qty: 0 | Refills: 0 | Status: DISCONTINUED | OUTPATIENT
Start: 2018-07-30 | End: 2018-07-30

## 2018-07-30 RX ORDER — SODIUM CHLORIDE 9 MG/ML
1000 INJECTION INTRAMUSCULAR; INTRAVENOUS; SUBCUTANEOUS ONCE
Qty: 0 | Refills: 0 | Status: COMPLETED | OUTPATIENT
Start: 2018-07-30 | End: 2018-07-30

## 2018-07-30 RX ORDER — POTASSIUM CHLORIDE 20 MEQ
40 PACKET (EA) ORAL ONCE
Qty: 0 | Refills: 0 | Status: DISCONTINUED | OUTPATIENT
Start: 2018-07-30 | End: 2018-07-30

## 2018-07-30 RX ORDER — WARFARIN SODIUM 2.5 MG/1
1 TABLET ORAL
Qty: 0 | Refills: 0 | COMMUNITY

## 2018-07-30 RX ORDER — DIAZEPAM 5 MG
1 TABLET ORAL
Qty: 10 | Refills: 0 | OUTPATIENT
Start: 2018-07-30

## 2018-07-30 RX ORDER — POTASSIUM CHLORIDE 20 MEQ
40 PACKET (EA) ORAL ONCE
Qty: 0 | Refills: 0 | Status: COMPLETED | OUTPATIENT
Start: 2018-07-30 | End: 2018-07-30

## 2018-07-30 RX ORDER — POTASSIUM CHLORIDE 20 MEQ
40 PACKET (EA) ORAL ONCE
Qty: 0 | Refills: 0 | Status: DISCONTINUED | OUTPATIENT
Start: 2018-07-30 | End: 2018-08-03

## 2018-07-30 RX ORDER — DIAZEPAM 5 MG
5 TABLET ORAL ONCE
Qty: 0 | Refills: 0 | Status: DISCONTINUED | OUTPATIENT
Start: 2018-07-30 | End: 2018-07-30

## 2018-07-30 RX ADMIN — Medication 5 MILLIGRAM(S): at 17:52

## 2018-07-30 RX ADMIN — MORPHINE SULFATE 4 MILLIGRAM(S): 50 CAPSULE, EXTENDED RELEASE ORAL at 15:49

## 2018-07-30 RX ADMIN — SODIUM CHLORIDE 1000 MILLILITER(S): 9 INJECTION INTRAMUSCULAR; INTRAVENOUS; SUBCUTANEOUS at 15:23

## 2018-07-30 RX ADMIN — MORPHINE SULFATE 4 MILLIGRAM(S): 50 CAPSULE, EXTENDED RELEASE ORAL at 15:23

## 2018-07-30 NOTE — ED PROVIDER NOTE - MUSCULOSKELETAL MINIMAL EXAM
no midline spinal ttp, +R lumbar paraspinous ttp no midline spinal ttp, +R lumbar paraspinous ttp, neg straight leg raise bilaterally

## 2018-07-30 NOTE — ED PROVIDER NOTE - CARE PLAN
Principal Discharge DX:	Generalized abdominal pain Principal Discharge DX:	Generalized abdominal pain  Secondary Diagnosis:	Hypokalemia  Secondary Diagnosis:	Acute right-sided low back pain without sciatica

## 2018-07-30 NOTE — ED PROVIDER NOTE - OBJECTIVE STATEMENT
77F h/o osteoporosis, HTN, p/w intermittent R flank pain non radiating x 3 days, worse today so came in. Has not tried anything for pain. No weakness or numbness in arms or legs. No bowel or bladder incontinence or retention. No dysuria or hematuria. No fevers or chills. No nausea or vomiting. No trauma. No abdo surgeries.

## 2025-05-24 NOTE — DISCHARGE NOTE ADULT - WARFARIN/COUMADIN IS USED TO PREVENT NEW BLOOD CLOTS AND KEEP EXISTING ONES FROM GETTING BIGGER. NEVER SKIP A DOSE OF WARFARIN/COUMADIN. IF YOU FORGET TO TAKE YOUR WARFARIN/COUMADIN, DO NOT TAKE
May 24, 2025      A [employer] Message has been sent to the patient for PATIENT ROUNDING with [facility] [place]    
Statement Selected